# Patient Record
Sex: MALE | Race: AMERICAN INDIAN OR ALASKA NATIVE
[De-identification: names, ages, dates, MRNs, and addresses within clinical notes are randomized per-mention and may not be internally consistent; named-entity substitution may affect disease eponyms.]

---

## 2018-09-16 ENCOUNTER — HOSPITAL ENCOUNTER (EMERGENCY)
Dept: HOSPITAL 31 - C.ER | Age: 58
LOS: 1 days | Discharge: HOME | End: 2018-09-17
Payer: COMMERCIAL

## 2018-09-16 VITALS — TEMPERATURE: 98.2 F

## 2018-09-16 DIAGNOSIS — F11.10: Primary | ICD-10-CM

## 2018-09-16 LAB
ALBUMIN SERPL-MCNC: 4.4 G/DL (ref 3.5–5)
ALBUMIN/GLOB SERPL: 1.5 {RATIO} (ref 1–2.1)
ALT SERPL-CCNC: 34 U/L (ref 21–72)
AST SERPL-CCNC: 29 U/L (ref 17–59)
BASE EXCESS BLDV CALC-SCNC: 5.3 MMOL/L (ref 0–2)
BASOPHILS # BLD AUTO: 0 K/UL (ref 0–0.2)
BASOPHILS NFR BLD: 0.6 % (ref 0–2)
BNP SERPL-MCNC: < 11.1 PG/ML (ref 0–900)
BUN SERPL-MCNC: 15 MG/DL (ref 9–20)
CALCIUM SERPL-MCNC: 9.5 MG/DL (ref 8.6–10.4)
EOSINOPHIL # BLD AUTO: 0.1 K/UL (ref 0–0.7)
EOSINOPHIL NFR BLD: 1.8 % (ref 0–4)
ERYTHROCYTE [DISTWIDTH] IN BLOOD BY AUTOMATED COUNT: 14.3 % (ref 11.5–14.5)
GFR NON-AFRICAN AMERICAN: > 60
HGB BLD-MCNC: 14.8 G/DL (ref 12–18)
LIPASE: 79 U/L (ref 23–300)
LYMPHOCYTES # BLD AUTO: 2.7 K/UL (ref 1–4.3)
LYMPHOCYTES NFR BLD AUTO: 38 % (ref 20–40)
MCH RBC QN AUTO: 28.9 PG (ref 27–31)
MCHC RBC AUTO-ENTMCNC: 33.3 G/DL (ref 33–37)
MCV RBC AUTO: 86.7 FL (ref 80–94)
MONOCYTES # BLD: 0.8 K/UL (ref 0–0.8)
MONOCYTES NFR BLD: 11.9 % (ref 0–10)
NEUTROPHILS # BLD: 3.4 K/UL (ref 1.8–7)
NEUTROPHILS NFR BLD AUTO: 47.7 % (ref 50–75)
NRBC BLD AUTO-RTO: 0.1 % (ref 0–2)
PCO2 BLDV: 49 MMHG (ref 40–60)
PH BLDV: 7.41 [PH] (ref 7.32–7.43)
PLATELET # BLD: 192 K/UL (ref 130–400)
PMV BLD AUTO: 10.3 FL (ref 7.2–11.7)
RBC # BLD AUTO: 5.11 MIL/UL (ref 4.4–5.9)
VENOUS BLOOD FIO2: 21 %
VENOUS BLOOD GAS PO2: 58 MM/HG (ref 30–55)
WBC # BLD AUTO: 7.1 K/UL (ref 4.8–10.8)

## 2018-09-16 NOTE — C.PDOC
History Of Present Illness


58 year old male with PMHx of non insulin dependent diabetic presents to the ED 

c/o SOB, polyuria and polydipsia. Patient also states that he feels occasional 

night sweats as well. Patient is speaking in complete sentences. Patient denies 

fever, chills, nausea, vomit.


Time Seen by Provider: 09/16/18 22:32


Chief Complaint (Nursing): Shortness Of Breath


History Per: Patient


History/Exam Limitations: no limitations


Onset/Duration Of Symptoms: Days


Current Symptoms Are (Timing): Still Present


Initiating Event: Other


Quality: Other


Current Respiratory Medications: See Home Med List


Associated Symptoms: Other (polyuria and polydipsia)


Recent travel outside of the [place]: No


Additional History Per: Patient





Past Medical History


Reviewed: Historical Data, Nursing Documentation, Vital Signs


Vital Signs: 


 Last Vital Signs











Temp  98.2 F   09/16/18 22:31


 


Pulse  83   09/17/18 02:30


 


Resp  11 L  09/17/18 02:30


 


BP  97/64 L  09/17/18 02:30


 


Pulse Ox  90 L  09/17/18 02:30














- Medical History


PMH: Diabetes


Surgical History: No Surg Hx


Family History: States: No Known Family Hx





- Social History


Hx Tobacco Use: No


Hx Alcohol Use: No


Hx Substance Use: No





Review Of Systems


Constitutional: Negative for: Fever, Chills


Cardiovascular: Negative for: Chest Pain


Respiratory: Positive for: Shortness of Breath.  Negative for: Cough


Gastrointestinal: Negative for: Nausea, Vomiting, Abdominal Pain


Genitourinary: Negative for: Dysuria, Hematuria


Skin: Negative for: Rash


Neurological: Negative for: Weakness, Numbness





Physical Exam





- Physical Exam


Appears: Non-toxic, No Acute Distress, Other (morbidly obese)


Skin: Warm, Dry


Head: Normacephalic


Eye(s): bilateral: Normal Inspection


Oral Mucosa: Moist


Neck: Supple


Chest: Symmetrical


Cardiovascular: Rhythm Regular


Respiratory: No Rales, No Rhonchi, No Wheezing


Gastrointestinal/Abdominal: Bowel Sounds (active), Soft, No Tenderness, 

Distention, No Guarding, No Rebound, Other (obese)


Male Genital: Other (right testicle undescended, barely palpable at inguinal 

canal. Left testicle normal. )


Extremity: No Tenderness, No Swelling


Extremity: Bilateral: Atraumatic, Normal Color And Temperature, Normal ROM


Neurological/Psych: Oriented x3, Normal Speech


Gait: Steady





ED Course And Treatment





- Laboratory Results


Result Diagrams: 


 09/16/18 23:07





 09/16/18 23:07


ECG: Interpreted By Me, Viewed By Me


ECG Rhythm: Sinus Rhythm (97), Nonspecific Changes


Pulse Ox Interpretation: Normal





- Radiology


CXR: Interpreted by Me, Viewed By Me


CXR Interpretation: Yes: Cardiomegaly, Other (chf).  No: Infiltrates, Fracture


Progress Note: Plan:  - VBG.  - CT head.  - Labs.  - CXR.  - UA.  Patient 

states that his right testicle being undescended has been a chronic condition 

that he has had for the past couple months.


Reevaluation Time: 02:37


Reassessment Condition: Improved





Medical Decision Making


Medical Decision Making: 





Upon provider reevaluation patient is feeling better, is medically stable, and 

requires no further treatment in the ED at this time. Patient will be 

discharged home  . Counseling was provided and all questions were answered 

regarding diagnosis and need for follow up with dr bryant. There is agreement 

to discharge plan. Return if symptoms persist or worsen





Disposition


Counseled Patient/Family Regarding: Studies Performed, Diagnosis, Need For 

Followup





- Disposition


Referrals: 


Tylor Bryant MD [Staff Provider] - 


Disposition: HOME/ ROUTINE


Disposition Time: 22:33


Condition: FAIR


Additional Instructions: 


Please return if symptom recur


Instructions:  Opioid Use Disorder


Forms:  [facility] (English)





- Clinical Impression


Clinical Impression: 


 Opioid use disorder








- Scribe Statement


The provider has reviewed the documentation as recorded by the Scribe


Lewis Merlos





All medical record entries made by the Scribe were at my direction and 

personally dictated by me. I have reviewed the chart and agree that the record 

accurately reflects my personal performance of the history, physical exam, 

medical decision making, and the department course for this patient. I have 

also personally directed, reviewed, and agree with the discharge instructions 

and disposition.

## 2018-09-17 VITALS
OXYGEN SATURATION: 90 % | DIASTOLIC BLOOD PRESSURE: 64 MMHG | SYSTOLIC BLOOD PRESSURE: 97 MMHG | HEART RATE: 83 BPM | RESPIRATION RATE: 11 BRPM

## 2018-09-17 LAB
BILIRUB UR-MCNC: NEGATIVE MG/DL
GLUCOSE UR STRIP-MCNC: NORMAL MG/DL
HYALINE CASTS #/AREA URNS LPF: (no result) /LPF (ref 0–2)
LEUKOCYTE ESTERASE UR-ACNC: (no result) LEU/UL
PH UR STRIP: 7 [PH] (ref 5–8)
PROT UR STRIP-MCNC: NEGATIVE MG/DL
RBC # UR STRIP: NEGATIVE /UL
SP GR UR STRIP: 1.02 (ref 1–1.03)
SQUAMOUS EPITHIAL: < 1 /HPF (ref 0–5)
UROBILINOGEN UR-MCNC: NORMAL MG/DL (ref 0.2–1)

## 2018-09-17 NOTE — RAD
Chest x-ray single frontal view 



History: Diabetic. 



Comparison: 10/12/2012 



Findings: 



Moderate venous congestion. 



Cardiomegaly. 



Biapical pleural thickening with upper lobe granulomatous changes. 



Degenerative changes in the spine. 



Impression: 



Moderate venous congestion. 



Cardiomegaly.

## 2018-09-17 NOTE — CARD
--------------- APPROVED REPORT --------------





Date of service: 09/16/2018



EKG Measurement

Heart [id number]

OK [id number]

[id number]

[id number]

[id number]



<Conclusion>

Normal sinus rhythm

Nonspecific T wave abnormality

Abnormal ECG

## 2018-09-17 NOTE — CT
Date of service: 



09/17/2018



PROCEDURE:  CT HEAD WITHOUT CONTRAST.



HISTORY:

dizzyness



COMPARISON:

None available.



TECHNIQUE:

Axial computed tomography images were obtained through the head/brain 

without intravenous contrast.  



Radiation dose:



Total exam DLP = 1080 mGy-cm.



This CT exam was performed using one or more of the following dose 

reduction techniques: Automated exposure control, adjustment of the 

mA and/or kV according to patient size, and/or use of iterative 

reconstruction technique.



FINDINGS:



HEMORRHAGE:

No intracranial hemorrhage. 



BRAIN:

No mass effect or edema.  Scattered focal lucencies in the 

subcortical and periventricular white matter suggestive for chronic 

microvascular ischemic change. Punctate left basal ganglia lacunar 

infarct. 



VENTRICLES:

Unremarkable. No hydrocephalus. 



CALVARIUM:

Unremarkable. Bony protuberance/exostosis emanating off the posterior 

right occipital calvarium. 



PARANASAL SINUSES:

Small amount of fluid in the left sphenoid sinus.



MASTOID AIR CELLS:

Unremarkable as visualized. No inflammatory changes.



OTHER FINDINGS:

None.



IMPRESSION:

Small amount of fluid in the left sphenoid sinus.  Clinical 

correlation.



Chronic microvascular ischemic changes.



If symptoms persist, consider correlation with MRI.



These findings were preliminarily reported at 12:44 a.m. on 9/17/2018 

by Dr. Blayne Martínez from [facility].

## 2018-12-26 ENCOUNTER — HOSPITAL ENCOUNTER (INPATIENT)
Dept: HOSPITAL 31 - C.ER | Age: 58
LOS: 2 days | Discharge: HOME | DRG: 125 | End: 2018-12-28
Attending: INTERNAL MEDICINE | Admitting: INTERNAL MEDICINE
Payer: COMMERCIAL

## 2018-12-26 DIAGNOSIS — H53.8: Primary | ICD-10-CM

## 2018-12-26 DIAGNOSIS — E78.5: ICD-10-CM

## 2018-12-26 DIAGNOSIS — F40.240: ICD-10-CM

## 2018-12-26 DIAGNOSIS — N40.0: ICD-10-CM

## 2018-12-26 DIAGNOSIS — Z79.4: ICD-10-CM

## 2018-12-26 DIAGNOSIS — R20.0: ICD-10-CM

## 2018-12-26 DIAGNOSIS — F41.9: ICD-10-CM

## 2018-12-26 DIAGNOSIS — F11.90: ICD-10-CM

## 2018-12-26 DIAGNOSIS — I50.9: ICD-10-CM

## 2018-12-26 DIAGNOSIS — E78.00: ICD-10-CM

## 2018-12-26 DIAGNOSIS — E11.9: ICD-10-CM

## 2018-12-26 DIAGNOSIS — Z86.73: ICD-10-CM

## 2018-12-26 DIAGNOSIS — I11.0: ICD-10-CM

## 2018-12-26 DIAGNOSIS — F17.210: ICD-10-CM

## 2018-12-26 LAB
ALBUMIN SERPL-MCNC: 4.2 G/DL (ref 3.5–5)
ALBUMIN/GLOB SERPL: 1.5 {RATIO} (ref 1–2.1)
ALT SERPL-CCNC: 28 U/L (ref 21–72)
APTT BLD: 43 SECONDS (ref 21–34)
AST SERPL-CCNC: 23 U/L (ref 17–59)
BACTERIA #/AREA URNS HPF: (no result) /[HPF]
BASOPHILS # BLD AUTO: 0 K/UL (ref 0–0.2)
BASOPHILS NFR BLD: 0.5 % (ref 0–2)
BILIRUB UR-MCNC: NEGATIVE MG/DL
BUN SERPL-MCNC: 11 MG/DL (ref 9–20)
CALCIUM SERPL-MCNC: 8.6 MG/DL (ref 8.6–10.4)
EOSINOPHIL # BLD AUTO: 0.1 K/UL (ref 0–0.7)
EOSINOPHIL NFR BLD: 1.3 % (ref 0–4)
ERYTHROCYTE [DISTWIDTH] IN BLOOD BY AUTOMATED COUNT: 14.1 % (ref 11.5–14.5)
GFR NON-AFRICAN AMERICAN: > 60
GLUCOSE UR STRIP-MCNC: NORMAL MG/DL
HDLC SERPL-MCNC: 49 MG/DL (ref 30–70)
HGB BLD-MCNC: 14.6 G/DL (ref 12–18)
INR PPP: 1.1
LDLC SERPL-MCNC: 98 MG/DL (ref 0–129)
LEUKOCYTE ESTERASE UR-ACNC: (no result) LEU/UL
LYMPHOCYTES # BLD AUTO: 2.5 K/UL (ref 1–4.3)
LYMPHOCYTES NFR BLD AUTO: 38.5 % (ref 20–40)
MCH RBC QN AUTO: 28.4 PG (ref 27–31)
MCHC RBC AUTO-ENTMCNC: 32.7 G/DL (ref 33–37)
MCV RBC AUTO: 86.9 FL (ref 80–94)
MONOCYTES # BLD: 0.7 K/UL (ref 0–0.8)
MONOCYTES NFR BLD: 10.1 % (ref 0–10)
NEUTROPHILS # BLD: 3.2 K/UL (ref 1.8–7)
NEUTROPHILS NFR BLD AUTO: 49.6 % (ref 50–75)
NRBC BLD AUTO-RTO: 0.1 % (ref 0–2)
PH UR STRIP: 5 [PH] (ref 5–8)
PLATELET # BLD: 202 K/UL (ref 130–400)
PMV BLD AUTO: 10.1 FL (ref 7.2–11.7)
PROT UR STRIP-MCNC: NEGATIVE MG/DL
PROTHROMBIN TIME: 12.4 SECONDS (ref 9.7–12.2)
RBC # BLD AUTO: 5.13 MIL/UL (ref 4.4–5.9)
RBC # UR STRIP: NEGATIVE /UL
SP GR UR STRIP: 1.04 (ref 1–1.03)
SQUAMOUS EPITHIAL: < 1 /HPF (ref 0–5)
UROBILINOGEN UR-MCNC: NORMAL MG/DL (ref 0.2–1)
WBC # BLD AUTO: 6.5 K/UL (ref 4.8–10.8)

## 2018-12-26 NOTE — C.PDOC
History Of Present Illness





pt presents with 4 days of left vision change, decreased vision - occasionally 

blind in left eye, and right sided numbness. No f/c/n/v. No cp or palpitations


Time Seen by Provider: 12/26/18 20:29


Chief Complaint (Nursing): Weakness/Neurological Deficit


History Per: Patient, Family


History/Exam Limitations: no limitations


Onset/Duration Of Symptoms: Days (4)


Current Symptoms Are (Timing): Still Present


Seizure Or Post-ictal Symptoms: None


Fall Associated With With Symptoms: No


Severity: None


Recent travel outside of the [place]: No





- Symptoms Of CVA


Associated Symptoms: New Vision Deficit(Left)


Character Of Deficits: Left: Vision Problems


Recent Aspirin Use: No


Current Coumadin Use?: No


Recent Head Trauma: No





Past Medical History


Reviewed: Historical Data, Nursing Documentation, Vital Signs


Vital Signs: 





                                Last Vital Signs











Temp  98.6 F   12/26/18 19:24


 


Pulse  95 H  12/26/18 19:24


 


Resp  16   12/26/18 19:24


 


BP  177/97 H  12/26/18 19:24


 


Pulse Ox  96   12/26/18 19:24














- Medical History


PMH: Diabetes, HTN, Hypercholesterolemia


Family History: States: No Known Family Hx





- Social History


Hx Tobacco Use: No


Hx Alcohol Use: No


Hx Substance Use: Yes





- Immunization History


Hx Tetanus Toxoid Vaccination: No


Hx Influenza Vaccination: No


Hx Pneumococcal Vaccination: No





Review Of Systems


Constitutional: Negative for: Fever, Chills


Eyes: Positive for: Vision Change


ENT: Negative for: Throat Pain


Cardiovascular: Negative for: Chest Pain


Respiratory: Negative for: Shortness of Breath


Gastrointestinal: Negative for: Nausea, Vomiting, Abdominal Pain


Genitourinary: Negative for: Dysuria


Musculoskeletal: Negative for: Back Pain


Skin: Negative for: Rash


Neurological: Positive for: Numbness (right side)


Psych: Negative for: Anxiety





Physical Exam





- Physical Exam


Appears: Non-toxic, No Acute Distress


Skin: Warm, Dry


Head: Normacephalic


Eye(s): bilateral: Normal Inspection, PERRL


Oral Mucosa: Moist


Neck: Trachea Midline, Supple


Chest: Symmetrical


Cardiovascular: Rhythm Regular


Respiratory: No Rales, No Rhonchi, No Wheezing


Gastrointestinal/Abdominal: Soft, No Tenderness, No Distention


Back: No CVA Tenderness


Extremity: Normal ROM


Extremity: Bilateral: Atraumatic, No Pedal Edema, Normal Color And Temperature


Pulses: Left Dorsalis Pedis: Normal, Right Dorsalis Pedis: Normal


Neurological/Psych: Oriented x3, Normal Cranial Nerves, Normal Sensation, Other 

(right sided paresthisia)


Gait: With Assistance (cane)





ED Course And Treatment





- Laboratory Results


Result Diagrams: 


                                 12/26/18 20:42





                                 12/26/18 20:42


ECG: Interpreted By Me, Viewed By Me


ECG Rhythm: Sinus Rhythm, Nonspecific Changes


O2 Sat by Pulse Oximetry: 96


Pulse Ox Interpretation: Normal


Progress Note: spoke with dr stark, neurology - is aware of ct findings. she spoke

with the interventional neurologist - no intervention.  Ok with asa 325 mg.  

spoke with the patient , and his wife(hipaa compliant)  and is aware of the ct 

findings and diagnosis





Critical Care Time





- Critical Care Note


Total Time (in mins): 30


Documented critical care: time excludes all time spent performing seperately 

billable procedures.





NIHSS Stroke Scale 2





- Date/Time Evaluation Performed


Date Performed: 12/26/18


Time Performed: 20:24


When Was NIHSS Performed: Baseline





- How Severe is the Stroke


Level of Consciousness: 0=Alert


LOC to Questions: 0=Both comments correct


LOC to commands: 0=Obeys both correctly


Best Gaze: 0=Normal


Visual: 1=Partial hemianopia


Facial: 0=Normal


Motor Arm - Left: 0=No drift


Motor Arm - Right: 0=No drift


Motor Leg - Left: 0=No drift


Motor Leg - Right: 1=Drift before 5 sec


Limb Ataxia: 0=Absent


Sensory: 0=Normal


Best Language: 0=No aphasia


Dysarthia: 0=Normal articulation


Extinction & Inattention (Neglect): 0=Normal, no object


Score: 2





rTPA Inclusion/Exclusion





- Refusal of Treatment


Patient Refused Treatment: No





- Inclusion Criteria for Altepase


Patient is 18 years or Older: Yes


The Clinical Diagnosis of Ischemic Stroke That is Causing a Potentially 

Disabling Neurological Deficit: Yes


Time of Onset is Well Established to be Less Than 270 Minute Before Treatment 

Would Begin: No


Risk/Benefit Discussed With Patient/Family Member Present: Yes





- Exclusion Criteria for Altepase


Uncontrolled Hypertension at Time of Treatment (Systolic BP above 185 or Taveras

tolic BP above 110 mmHg): No


Active Internal Bleeding: No


Known Bleeding Diathesis Including but Not Limited to: Platelets Below 

100,000/mm,PTT Above 40 sec After Heparin Use, Current Use of Oral Anitcoagulant

With INR Greater Than 1.7 or PT Greater Than 15 secs: No


Evidence of an Intracranial Hemorrhage: No


Evidence of Major Acute Infarct With Signs Greater Than 1/3 MCA Territory: No


Suspicion of Subarachnoid Hemorrhage on Pretreatment Evaluation Even if CT Head 

Negative For Hemorrhage: No





- Warning to TPA With Conditions


Following Conditions Weighed Against Anticipated Benefit: Yes


Condition: Stroke Serevity Too Mild





Disposition


Discussed With Dr.: Wallace Davidson


Comment: accepted the pt onhis service and took over the care at 9:25 PM


Doctor Will See Patient In The: ED


Counseled Patient/Family Regarding: Studies Performed, Diagnosis





- Disposition


Disposition: HOSPITALIZED


Disposition Time: 20:29


Condition: GUARDED


Forms:  [facility] (English)





- POA


Present On Arrival: None





- Clinical Impression


Clinical Impression: 


 CVA (cerebral vascular accident)








Decision To Admit





- Pt Status Changed To:


Hospital Disposition Of: Inpatient





- Admit Certification


Admit to Inpatient:: After my assessment, the patient will require 

hospitalization for at least two midnights.  This is because of the severity of 

symptoms shown, intensity of services needed, and/or the medical risk in this 

patient being treated as an outpatient.





- InPatient:


Physician Admission Certification: I certify that this patient requires 2 or 

more midnights of care for the following reason:: After my assessment, the 

patient will require hospitalization for at least two midnights.  This is 

because of the severity of symptoms shown, intensity of services needed, and/or 

the medical risk in this patient being treated as an outpatient.





- .


Bed Request Type: Telemetry


Admitting Physician: Wallace Davidson


Patient Diagnosis: 


 CVA (cerebral vascular accident)

## 2018-12-27 VITALS — RESPIRATION RATE: 20 BRPM

## 2018-12-27 LAB
ALBUMIN SERPL-MCNC: 3.7 G/DL (ref 3.5–5)
ALBUMIN/GLOB SERPL: 1.4 {RATIO} (ref 1–2.1)
ALT SERPL-CCNC: 30 U/L (ref 21–72)
AST SERPL-CCNC: 19 U/L (ref 17–59)
BASOPHILS # BLD AUTO: 0.1 K/UL (ref 0–0.2)
BASOPHILS NFR BLD: 0.8 % (ref 0–2)
BUN SERPL-MCNC: 12 MG/DL (ref 9–20)
CALCIUM SERPL-MCNC: 8.2 MG/DL (ref 8.6–10.4)
EOSINOPHIL # BLD AUTO: 0.1 K/UL (ref 0–0.7)
EOSINOPHIL NFR BLD: 1.9 % (ref 0–4)
ERYTHROCYTE [DISTWIDTH] IN BLOOD BY AUTOMATED COUNT: 14.3 % (ref 11.5–14.5)
GFR NON-AFRICAN AMERICAN: > 60
HGB BLD-MCNC: 13.8 G/DL (ref 12–18)
LYMPHOCYTES # BLD AUTO: 2.6 K/UL (ref 1–4.3)
LYMPHOCYTES NFR BLD AUTO: 38.4 % (ref 20–40)
MCH RBC QN AUTO: 28.7 PG (ref 27–31)
MCHC RBC AUTO-ENTMCNC: 32.7 G/DL (ref 33–37)
MCV RBC AUTO: 87.9 FL (ref 80–94)
MONOCYTES # BLD: 0.6 K/UL (ref 0–0.8)
MONOCYTES NFR BLD: 9.5 % (ref 0–10)
NEUTROPHILS # BLD: 3.3 K/UL (ref 1.8–7)
NEUTROPHILS NFR BLD AUTO: 49.4 % (ref 50–75)
NRBC BLD AUTO-RTO: 0.1 % (ref 0–2)
PLATELET # BLD: 182 K/UL (ref 130–400)
PMV BLD AUTO: 9.9 FL (ref 7.2–11.7)
RBC # BLD AUTO: 4.79 MIL/UL (ref 4.4–5.9)
WBC # BLD AUTO: 6.7 K/UL (ref 4.8–10.8)

## 2018-12-27 RX ADMIN — HUMAN INSULIN SCH: 100 INJECTION, SOLUTION SUBCUTANEOUS at 07:38

## 2018-12-27 RX ADMIN — HUMAN INSULIN SCH: 100 INJECTION, SOLUTION SUBCUTANEOUS at 11:30

## 2018-12-27 RX ADMIN — HUMAN INSULIN SCH: 100 INJECTION, SOLUTION SUBCUTANEOUS at 21:45

## 2018-12-27 RX ADMIN — HUMAN INSULIN SCH: 100 INJECTION, SOLUTION SUBCUTANEOUS at 17:44

## 2018-12-27 NOTE — RAD
Date of service: 



12/26/2018



HISTORY:

 Code Stroke 



COMPARISON:

9/16/2018 



FINDINGS:



LUNGS:

No consolidation appreciated.



PLEURA:

No significant pleural effusion identified, no pneumothorax apparent.



CARDIOVASCULAR:

There is presence of aortic atherosclerotic calcification on x-ray.



Cardiomegaly-allowing for differences in technique likely similar.  

Pulmonary venous congestion suggested--similar to increased since 

prior exam. 



Tortuous thoracic aorta accentuated. 



OSSEOUS STRUCTURES:

Dextroscoliosis suggested.



VISUALIZED UPPER ABDOMEN:

Normal.



OTHER FINDINGS:

None.



IMPRESSION:

No interval consolidation. 



Cardiomegaly and pulmonary venous congestion-as above.

## 2018-12-27 NOTE — CARD
--------------- APPROVED REPORT --------------





Date of service: 12/27/2018



EXAM: Two-dimensional and M-mode echocardiogram with Doppler and 

color Doppler.



Other Information 

Technically limited study due to  Patient was uncooperative



INDICATION

CVA/TIA opioid use



RISK FACTORS

Hypertension 

Hyperlipidemia

Diabetes

Smoking 



2D DIMENSIONS 

IVSd0.9   (0.7-1.1cm)Aortic Root (2D)3.4   (2.0-3.7cm)

LVDd4.8   (3.9-5.9cm)PWd0.8   (0.7-1.1cm)

LVDs3.1   (2.5-4.0cm)FS (%) 35.1   %

LVEF (%)64.3   (>50%)IVC0.00 cm



M-Mode DIMENSIONS 

Left Atrium (MM)3.75   (2.5-4.0cm)IVSd0.69   (0.7-1.1cm)

Aortic Root3.09   (2.2-3.7cm)LVDd5.48   (4.0-5.6cm)

Aortic Cusp Exc.1.81   (1.5-2.0cm)PWd0.69   (0.7-1.1cm)

FS (%) 31   %LVDs3.78   (2.0-3.8cm)

LVEF (%)58   (>50%)



Mitral Valve

MV E Vvexdhcu969.3cm/sMV A Tpujydpa11.0cm/sE/A ratio1.3



TDI

Lateral E' Peak V9.64cm/sMedial E' Peak V7.77cm/sE/Lateral E'10.6

E/Medial E'13.2



Tricuspid Valve

TR Peak Gwnzbyea008dt/sTR Peak Gr.[id number]



 LEFT VENTRICLE 

The left ventricle is normal size.

There is normal left ventricular wall thickness.

Left ventricle systolic function is normal. The Ejection Fraction is 

60-65%.

There is normal LV segmental wall motion.

The left ventricular diastolic function is normal.

No left ventricle thrombus noted on this study.



 RIGHT VENTRICLE 

The right ventricle is normal size.

The right ventricular systolic function is normal.



 ATRIA 

The left atrium size is normal.

The right atrium size is normal.



 AORTIC VALVE 

The aortic valve is mildly sclerotic.

The aortic valve is trileaflet.

No aortic regurgitation is present.

There is no aortic valvular stenosis. 

There is no aortic valvular vegetation.



 MITRAL VALVE 

Mitral annular calcification is mild.

There is no evidence of mitral valve prolapse.

There is no mitral valve stenosis.

Mitral regurgitation is trace to mild.



 TRICUSPID VALVE 

The tricuspid valve is normal in structure.

There is mild tricuspid regurgitation.

Right ventricular systolic pressure is estimated at less than 30 

mmHg. 

There is no pulmonary hypertension.

There is no tricuspid valve prolapse or vegetation.

There is no tricuspid valve stenosis. 



 PULMONIC VALVE 

The pulmonic valve is not well visualized.

There is no pulmonic valvular regurgitation. 



 GREAT VESSELS 

The aortic root is normal in size.

The IVC is normal in size and collapses >50% with inspiration.



 PERICARDIAL EFFUSION 

There is no pericardial effusion.

There is no pleural effusion.



<Conclusion>

The left ventricle is normal size.

Left ventricle systolic function is normal. The Ejection Fraction is 

60-65%.

The left ventricular diastolic function is normal.

The right ventricle is normal size.

The right ventricular systolic function is normal.

The left atrium size is normal.

The right atrium size is normal.

Mitral regurgitation is trace to mild.

There is mild tricuspid regurgitation.

## 2018-12-27 NOTE — CP.PCM.HP
<Sybil Jefferson - Last Filed: 12/27/18 07:13>





History of Present Illness





- History of Present Illness


History of Present Illness: 





CC vision problems, right side numbness and weakness 





HPI: Patient is a 58 year old male with history of HTN, DM,HLD, BPH who presents

with 4 day history of progressively worsening right sided arm and leg numbness 

and weakness with blurriness and redness of left eye. He states that he has 

never had these symptoms before. He admits to using heroin, but does not want 

his wife to know. He states that he did not think much of it initially, however 

later noticed that his right side felt more numb and heavy than before. He also 

states that he has issues with his vision, described as difficulty seeing on his

right side. He states that he continues to be able to see on his left  side, but

not on his right side unless he turns to face it completely. He is able to see 

things that are up close, however has more difficulty seeing things at a 

distance. He denies fevers, chills, headache, dizziness, chest pain, shortness 

of breath, nausea, vomiting, diarrhea, constipation, abdominal pain, urinary 

symptoms, leg pain. He states that he has been able to walk given his symptoms 

of numbness and weakness on his right side. 


Patient denies prior history of CVA. 





PMD: Elamir 





PMH: HTN, DM, BPH, HLD


PSH: cataract surgery 


Social hx: heroin use (not known to wife), smokes 12 cigarettes daily, denies 

alcohol use. 


Family hx: 


Allergies: NKDA 


Home meds: Metformin 500mg PO BID, Flomax 0.4mg PO daily, Lipitor 20mg PO daily,

ASA 81mg, Valsartan/HCTZ 12.5/160, Metoprolol 25mg PO daily


   





Present on Admission





- Present on Admission


Any Indicators Present on Admission: No





Review of Systems





- Constitutional


Constitutional: absent: Chills, Fever, Headache





- EENT


Eyes: Change in Vision


Ears: absent: Decreased Hearing


Nose/Mouth/Throat: absent: Nasal Congestion, Change in Voice, Hoarsness, Sore 

Throat





- Cardiovascular


Cardiovascular: absent: Chest Pain, Dyspnea, Palpitations, Syncope





- Respiratory


Respiratory: absent: Cough, Dyspnea





- Gastrointestinal


Gastrointestinal: absent: Abdominal Pain, Nausea, Vomiting





- Genitourinary


Genitourinary: absent: Difficulty Urinating, Dysuria





- Neurological


Neurological: Abnormal Gait, Numbness, Focal Weakness, Paresthesias





- Psychiatric


Psychiatric: absent: Anxiety, Depression





Past Patient History





- Past Social History


Smoking Status: Heavy Smoker > 10 Cigarettes Daily





- CARDIAC


Hx Hypercholesterolemia: Yes


Hx Hypertension: Yes





- ENDOCRINE/METABOLIC


Hx Diabetes Mellitus Type 2: Yes





- GENITOURINARY/GYNECOLOGICAL


Hx Prostate Problems: Yes





- PSYCHIATRIC


Hx Substance Use: Yes





- SURGICAL HISTORY


Hx Surgeries: No





Meds


Allergies/Adverse Reactions: 


                                    Allergies











Allergy/AdvReac Type Severity Reaction Status Date / Time


 


No Known Allergies Allergy   Unverified 09/16/18 22:33














Physical Exam





- Constitutional


Appears: Well, Non-toxic, No Acute Distress





- Head Exam


Head Exam: ATRAUMATIC, NORMOCEPHALIC





- Eye Exam


Eye Exam: Conjunctival injection (on left ), EOMI.  absent: Nystagmus, Scleral 

icterus


Pupil Exam: PERRL


Additional comments: 





Unable to see objects in right visual field 


Can see in central field and left visual field 





- ENT Exam


ENT Exam: Mucous Membranes Moist, Normal Oropharynx





- Neck Exam


Neck exam: Positive for: Full Rom.  Negative for: Tenderness





- Respiratory Exam


Respiratory Exam: Clear to Auscultation Bilateral, NORMAL BREATHING PATTERN.  

absent: Rales, Rhonchi, Wheezes, Respiratory Distress, Stridor





- Cardiovascular Exam


Cardiovascular Exam: REGULAR RHYTHM, +S1, +S2.  absent: Gallop, Rubs, Systolic 

Murmur





- GI/Abdominal Exam


GI & Abdominal Exam: Normal Bowel Sounds, Soft.  absent: Distended, Firm, 

Guarding, Hernia, Tenderness





- Extremities Exam


Extremities exam: Positive for: normal capillary refill, pedal pulses present.  

Negative for: calf tenderness, tenderness





- Neurological Exam


Neurological exam: Alert, Oriented x3


Additional comments: 





Sensation altered on right upper and lower extremity compared to left 


Drift noted on RUE and RLE compared to left 


Eyes can follow H in space 


Decreased sensation on entire right face 


Able to move tongue to both sides


no facial asymmetry 








- Psychiatric Exam


Psychiatric exam: Anxious (Asks multiple questions often repeated, unclear if 

this is a new finding of stroke or if patient is anxious )





- Skin


Skin Exam: Dry, Intact, Warm





Results





- Vital Signs


Recent Vital Signs: 





                                Last Vital Signs











Temp  98.3 F   12/26/18 21:31


 


Pulse  74   12/26/18 21:31


 


Resp  14   12/26/18 21:31


 


BP  142/90   12/26/18 21:31


 


Pulse Ox  96   12/26/18 21:33














- Labs


Result Diagrams: 


                                 12/27/18 06:04





                                 12/27/18 06:04


Labs: 





                         Laboratory Results - last 24 hr











  12/26/18 12/26/18 12/26/18





  19:28 20:42 20:42


 


WBC   6.5 


 


RBC   5.13 


 


Hgb   14.6 


 


Hct   44.5 


 


MCV   86.9 


 


MCH   28.4 


 


MCHC   32.7 L 


 


RDW   14.1 


 


Plt Count   202 


 


MPV   10.1 


 


Neut % (Auto)   49.6 L 


 


Lymph % (Auto)   38.5 


 


Mono % (Auto)   10.1 H 


 


Eos % (Auto)   1.3 


 


Baso % (Auto)   0.5 


 


Neut # (Auto)   3.2 


 


Lymph # (Auto)   2.5 


 


Mono # (Auto)   0.7 


 


Eos # (Auto)   0.1 


 


Baso # (Auto)   0.0 


 


PT    12.4 H


 


INR    1.1


 


APTT    43 H


 


Sodium   


 


Potassium   


 


Chloride   


 


Carbon Dioxide   


 


Anion Gap   


 


BUN   


 


Creatinine   


 


Est GFR ( Amer)   


 


Est GFR (Non-Af Amer)   


 


POC Glucose (mg/dL)  84  


 


Random Glucose   


 


Hemoglobin A1c   


 


Calcium   


 


Total Bilirubin   


 


AST   


 


ALT   


 


Alkaline Phosphatase   


 


Troponin I   


 


Total Protein   


 


Albumin   


 


Globulin   


 


Albumin/Globulin Ratio   


 


Triglycerides   


 


Cholesterol   


 


LDL Cholesterol Direct   


 


HDL Cholesterol   


 


Urine Color   


 


Urine Clarity   


 


Urine pH   


 


Ur Specific Gravity   


 


Urine Protein   


 


Urine Glucose (UA)   


 


Urine Ketones   


 


Urine Blood   


 


Urine Nitrate   


 


Urine Bilirubin   


 


Urine Urobilinogen   


 


Ur Leukocyte Esterase   


 


Urine WBC (Auto)   


 


Urine RBC (Auto)   


 


Ur Squamous Epith Cells   


 


Urine Bacteria   


 


Urine Opiates Screen   


 


Urine Methadone Screen   


 


Ur Barbiturates Screen   


 


Ur Phencyclidine Scrn   


 


Ur Amphetamines Screen   


 


U Benzodiazepines Scrn   


 


U Oth Cocaine Metabols   


 


U Cannabinoids Screen   


 


Blood Type   


 


Antibody Screen   














  12/26/18 12/26/18 12/26/18





  20:42 20:42 20:45


 


WBC   


 


RBC   


 


Hgb   


 


Hct   


 


MCV   


 


MCH   


 


MCHC   


 


RDW   


 


Plt Count   


 


MPV   


 


Neut % (Auto)   


 


Lymph % (Auto)   


 


Mono % (Auto)   


 


Eos % (Auto)   


 


Baso % (Auto)   


 


Neut # (Auto)   


 


Lymph # (Auto)   


 


Mono # (Auto)   


 


Eos # (Auto)   


 


Baso # (Auto)   


 


PT   


 


INR   


 


APTT   


 


Sodium  139  


 


Potassium  3.8  


 


Chloride  107  


 


Carbon Dioxide  25  


 


Anion Gap  11  


 


BUN  11  


 


Creatinine  0.8  


 


Est GFR ( Amer)  > 60  


 


Est GFR (Non-Af Amer)  > 60  


 


POC Glucose (mg/dL)   


 


Random Glucose  100  D  


 


Hemoglobin A1c   6.7 H 


 


Calcium  8.6  


 


Total Bilirubin  0.3  


 


AST  23  


 


ALT  28  


 


Alkaline Phosphatase  62  


 


Troponin I  < 0.0120  


 


Total Protein  7.0  


 


Albumin  4.2  


 


Globulin  2.7  


 


Albumin/Globulin Ratio  1.5  


 


Triglycerides  98  


 


Cholesterol  164  


 


LDL Cholesterol Direct  98  


 


HDL Cholesterol  49  


 


Urine Color   


 


Urine Clarity   


 


Urine pH   


 


Ur Specific Gravity   


 


Urine Protein   


 


Urine Glucose (UA)   


 


Urine Ketones   


 


Urine Blood   


 


Urine Nitrate   


 


Urine Bilirubin   


 


Urine Urobilinogen   


 


Ur Leukocyte Esterase   


 


Urine WBC (Auto)   


 


Urine RBC (Auto)   


 


Ur Squamous Epith Cells   


 


Urine Bacteria   


 


Urine Opiates Screen   


 


Urine Methadone Screen   


 


Ur Barbiturates Screen   


 


Ur Phencyclidine Scrn   


 


Ur Amphetamines Screen   


 


U Benzodiazepines Scrn   


 


U Oth Cocaine Metabols   


 


U Cannabinoids Screen   


 


Blood Type    A POSITIVE


 


Antibody Screen    Negative














  12/26/18 12/26/18 12/26/18





  20:50 21:17 21:43


 


WBC   


 


RBC   


 


Hgb   


 


Hct   


 


MCV   


 


MCH   


 


MCHC   


 


RDW   


 


Plt Count   


 


MPV   


 


Neut % (Auto)   


 


Lymph % (Auto)   


 


Mono % (Auto)   


 


Eos % (Auto)   


 


Baso % (Auto)   


 


Neut # (Auto)   


 


Lymph # (Auto)   


 


Mono # (Auto)   


 


Eos # (Auto)   


 


Baso # (Auto)   


 


PT   


 


INR   


 


APTT   


 


Sodium   


 


Potassium   


 


Chloride   


 


Carbon Dioxide   


 


Anion Gap   


 


BUN   


 


Creatinine   


 


Est GFR ( Amer)   


 


Est GFR (Non-Af Amer)   


 


POC Glucose (mg/dL)  90  


 


Random Glucose   


 


Hemoglobin A1c   


 


Calcium   


 


Total Bilirubin   


 


AST   


 


ALT   


 


Alkaline Phosphatase   


 


Troponin I   


 


Total Protein   


 


Albumin   


 


Globulin   


 


Albumin/Globulin Ratio   


 


Triglycerides   


 


Cholesterol   


 


LDL Cholesterol Direct   


 


HDL Cholesterol   


 


Urine Color   Yellow 


 


Urine Clarity   Clear 


 


Urine pH   5.0 


 


Ur Specific Gravity   1.044 H 


 


Urine Protein   Negative 


 


Urine Glucose (UA)   Normal 


 


Urine Ketones   Negative 


 


Urine Blood   Negative 


 


Urine Nitrate   Negative 


 


Urine Bilirubin   Negative 


 


Urine Urobilinogen   Normal 


 


Ur Leukocyte Esterase   Neg 


 


Urine WBC (Auto)   1 


 


Urine RBC (Auto)   1 


 


Ur Squamous Epith Cells   < 1 


 


Urine Bacteria   Rare 


 


Urine Opiates Screen    Positive H


 


Urine Methadone Screen    Negative


 


Ur Barbiturates Screen    Negative


 


Ur Phencyclidine Scrn    Negative


 


Ur Amphetamines Screen    Negative


 


U Benzodiazepines Scrn    Negative


 


U Oth Cocaine Metabols    Negative


 


U Cannabinoids Screen    Negative


 


Blood Type   


 


Antibody Screen   














Assessment & Plan





- Assessment and Plan (Free Text)


Assessment: 





58 year old male with history of HTN, DM, HLD who presents for 4 day history of 

progressively worsening right upper and lower extremity numbness with vision 

changes. 


Plan: 





Code stroke 


Vision changes


Right sided upper and lower extremity numbness and weakness 


Neurologist Dr. Cuevas consulted 


Received ASA 325mg PO in ED 


EKG SR at 76 


CT without contrast head: new left occipital hypodense area measuring 6.6 x 3.3 

x 2.4cm compatible with subacute infarct 


CTA CHF with pulmonary edema. Severe multilevel degenerative spondylosis. 

Calcified plaque at left carotid bulb wiith mild grade stenosis 0 -39% . 


f/u ECHO


f/u Brain MRI without contrast 


ASA 81mg PO 


Crestor 40mg PO QHS 





Hx of DM


ISS 


On Metformin at home - held 





Hx of HTN


Confirm meds with pharmacy





Hx of HLD 


Crestor 40mg PO HS 





Nicotine dependence


Nicotine patch 





Hx of BPH


Flomax 0.4mg PO 





PPX


Heparin 5000 units SC


Protonix 40mg IVP 


Heart healthy diet


PT/OT/speech 





Case discussed with Dr. Lety Jefferson, PGY1





<Wallace Davidson P - Last Filed: 12/27/18 07:36>





Results





- Vital Signs


Recent Vital Signs: 





                                Last Vital Signs











Temp  98.8 F   12/27/18 04:22


 


Pulse  69   12/27/18 04:22


 


Resp  20   12/27/18 04:22


 


BP  151/81 H  12/27/18 04:22


 


Pulse Ox  95   12/27/18 04:22














- Labs


Result Diagrams: 


                                 12/27/18 06:04





                                 12/27/18 06:04


Labs: 





                         Laboratory Results - last 24 hr











  12/26/18 12/26/18 12/26/18





  19:28 20:42 20:42


 


WBC   6.5 


 


RBC   5.13 


 


Hgb   14.6 


 


Hct   44.5 


 


MCV   86.9 


 


MCH   28.4 


 


MCHC   32.7 L 


 


RDW   14.1 


 


Plt Count   202 


 


MPV   10.1 


 


Neut % (Auto)   49.6 L 


 


Lymph % (Auto)   38.5 


 


Mono % (Auto)   10.1 H 


 


Eos % (Auto)   1.3 


 


Baso % (Auto)   0.5 


 


Neut # (Auto)   3.2 


 


Lymph # (Auto)   2.5 


 


Mono # (Auto)   0.7 


 


Eos # (Auto)   0.1 


 


Baso # (Auto)   0.0 


 


PT    12.4 H


 


INR    1.1


 


APTT    43 H


 


Sodium   


 


Potassium   


 


Chloride   


 


Carbon Dioxide   


 


Anion Gap   


 


BUN   


 


Creatinine   


 


Est GFR ( Amer)   


 


Est GFR (Non-Af Amer)   


 


POC Glucose (mg/dL)  84  


 


Random Glucose   


 


Hemoglobin A1c   


 


Calcium   


 


Phosphorus   


 


Magnesium   


 


Total Bilirubin   


 


AST   


 


ALT   


 


Alkaline Phosphatase   


 


Troponin I   


 


Total Protein   


 


Albumin   


 


Globulin   


 


Albumin/Globulin Ratio   


 


Triglycerides   


 


Cholesterol   


 


LDL Cholesterol Direct   


 


HDL Cholesterol   


 


Urine Color   


 


Urine Clarity   


 


Urine pH   


 


Ur Specific Gravity   


 


Urine Protein   


 


Urine Glucose (UA)   


 


Urine Ketones   


 


Urine Blood   


 


Urine Nitrate   


 


Urine Bilirubin   


 


Urine Urobilinogen   


 


Ur Leukocyte Esterase   


 


Urine WBC (Auto)   


 


Urine RBC (Auto)   


 


Ur Squamous Epith Cells   


 


Urine Bacteria   


 


Urine Opiates Screen   


 


Urine Methadone Screen   


 


Ur Barbiturates Screen   


 


Ur Phencyclidine Scrn   


 


Ur Amphetamines Screen   


 


U Benzodiazepines Scrn   


 


U Oth Cocaine Metabols   


 


U Cannabinoids Screen   


 


Blood Type   


 


Antibody Screen   














  12/26/18 12/26/18 12/26/18





  20:42 20:42 20:45


 


WBC   


 


RBC   


 


Hgb   


 


Hct   


 


MCV   


 


MCH   


 


MCHC   


 


RDW   


 


Plt Count   


 


MPV   


 


Neut % (Auto)   


 


Lymph % (Auto)   


 


Mono % (Auto)   


 


Eos % (Auto)   


 


Baso % (Auto)   


 


Neut # (Auto)   


 


Lymph # (Auto)   


 


Mono # (Auto)   


 


Eos # (Auto)   


 


Baso # (Auto)   


 


PT   


 


INR   


 


APTT   


 


Sodium  139  


 


Potassium  3.8  


 


Chloride  107  


 


Carbon Dioxide  25  


 


Anion Gap  11  


 


BUN  11  


 


Creatinine  0.8  


 


Est GFR ( Amer)  > 60  


 


Est GFR (Non-Af Amer)  > 60  


 


POC Glucose (mg/dL)   


 


Random Glucose  100  D  


 


Hemoglobin A1c   6.7 H 


 


Calcium  8.6  


 


Phosphorus   


 


Magnesium   


 


Total Bilirubin  0.3  


 


AST  23  


 


ALT  28  


 


Alkaline Phosphatase  62  


 


Troponin I  < 0.0120  


 


Total Protein  7.0  


 


Albumin  4.2  


 


Globulin  2.7  


 


Albumin/Globulin Ratio  1.5  


 


Triglycerides  98  


 


Cholesterol  164  


 


LDL Cholesterol Direct  98  


 


HDL Cholesterol  49  


 


Urine Color   


 


Urine Clarity   


 


Urine pH   


 


Ur Specific Gravity   


 


Urine Protein   


 


Urine Glucose (UA)   


 


Urine Ketones   


 


Urine Blood   


 


Urine Nitrate   


 


Urine Bilirubin   


 


Urine Urobilinogen   


 


Ur Leukocyte Esterase   


 


Urine WBC (Auto)   


 


Urine RBC (Auto)   


 


Ur Squamous Epith Cells   


 


Urine Bacteria   


 


Urine Opiates Screen   


 


Urine Methadone Screen   


 


Ur Barbiturates Screen   


 


Ur Phencyclidine Scrn   


 


Ur Amphetamines Screen   


 


U Benzodiazepines Scrn   


 


U Oth Cocaine Metabols   


 


U Cannabinoids Screen   


 


Blood Type    A POSITIVE


 


Antibody Screen    Negative














  12/26/18 12/26/18 12/26/18





  20:50 21:17 21:43


 


WBC   


 


RBC   


 


Hgb   


 


Hct   


 


MCV   


 


MCH   


 


MCHC   


 


RDW   


 


Plt Count   


 


MPV   


 


Neut % (Auto)   


 


Lymph % (Auto)   


 


Mono % (Auto)   


 


Eos % (Auto)   


 


Baso % (Auto)   


 


Neut # (Auto)   


 


Lymph # (Auto)   


 


Mono # (Auto)   


 


Eos # (Auto)   


 


Baso # (Auto)   


 


PT   


 


INR   


 


APTT   


 


Sodium   


 


Potassium   


 


Chloride   


 


Carbon Dioxide   


 


Anion Gap   


 


BUN   


 


Creatinine   


 


Est GFR ( Amer)   


 


Est GFR (Non-Af Amer)   


 


POC Glucose (mg/dL)  90  


 


Random Glucose   


 


Hemoglobin A1c   


 


Calcium   


 


Phosphorus   


 


Magnesium   


 


Total Bilirubin   


 


AST   


 


ALT   


 


Alkaline Phosphatase   


 


Troponin I   


 


Total Protein   


 


Albumin   


 


Globulin   


 


Albumin/Globulin Ratio   


 


Triglycerides   


 


Cholesterol   


 


LDL Cholesterol Direct   


 


HDL Cholesterol   


 


Urine Color   Yellow 


 


Urine Clarity   Clear 


 


Urine pH   5.0 


 


Ur Specific Gravity   1.044 H 


 


Urine Protein   Negative 


 


Urine Glucose (UA)   Normal 


 


Urine Ketones   Negative 


 


Urine Blood   Negative 


 


Urine Nitrate   Negative 


 


Urine Bilirubin   Negative 


 


Urine Urobilinogen   Normal 


 


Ur Leukocyte Esterase   Neg 


 


Urine WBC (Auto)   1 


 


Urine RBC (Auto)   1 


 


Ur Squamous Epith Cells   < 1 


 


Urine Bacteria   Rare 


 


Urine Opiates Screen    Positive H


 


Urine Methadone Screen    Negative


 


Ur Barbiturates Screen    Negative


 


Ur Phencyclidine Scrn    Negative


 


Ur Amphetamines Screen    Negative


 


U Benzodiazepines Scrn    Negative


 


U Oth Cocaine Metabols    Negative


 


U Cannabinoids Screen    Negative


 


Blood Type   


 


Antibody Screen   














  12/27/18 12/27/18 12/27/18





  06:04 06:04 06:20


 


WBC  6.7  


 


RBC  4.79  


 


Hgb  13.8  


 


Hct  42.1  


 


MCV  87.9  


 


MCH  28.7  


 


MCHC  32.7 L  


 


RDW  14.3  


 


Plt Count  182  


 


MPV  9.9  


 


Neut % (Auto)  49.4 L  


 


Lymph % (Auto)  38.4  


 


Mono % (Auto)  9.5  


 


Eos % (Auto)  1.9  


 


Baso % (Auto)  0.8  


 


Neut # (Auto)  3.3  


 


Lymph # (Auto)  2.6  


 


Mono # (Auto)  0.6  


 


Eos # (Auto)  0.1  


 


Baso # (Auto)  0.1  


 


PT   


 


INR   


 


APTT   


 


Sodium   136 


 


Potassium   3.8 


 


Chloride   107 


 


Carbon Dioxide   23 


 


Anion Gap   11 


 


BUN   12 


 


Creatinine   0.7 L 


 


Est GFR ( Amer)   > 60 


 


Est GFR (Non-Af Amer)   > 60 


 


POC Glucose (mg/dL)    100


 


Random Glucose   95 


 


Hemoglobin A1c   


 


Calcium   8.2 L 


 


Phosphorus   3.3 


 


Magnesium   2.0 


 


Total Bilirubin   0.4 


 


AST   19 


 


ALT   30 


 


Alkaline Phosphatase   53 


 


Troponin I   


 


Total Protein   6.4 


 


Albumin   3.7 


 


Globulin   2.7 


 


Albumin/Globulin Ratio   1.4 


 


Triglycerides   


 


Cholesterol   


 


LDL Cholesterol Direct   


 


HDL Cholesterol   


 


Urine Color   


 


Urine Clarity   


 


Urine pH   


 


Ur Specific Gravity   


 


Urine Protein   


 


Urine Glucose (UA)   


 


Urine Ketones   


 


Urine Blood   


 


Urine Nitrate   


 


Urine Bilirubin   


 


Urine Urobilinogen   


 


Ur Leukocyte Esterase   


 


Urine WBC (Auto)   


 


Urine RBC (Auto)   


 


Ur Squamous Epith Cells   


 


Urine Bacteria   


 


Urine Opiates Screen   


 


Urine Methadone Screen   


 


Ur Barbiturates Screen   


 


Ur Phencyclidine Scrn   


 


Ur Amphetamines Screen   


 


U Benzodiazepines Scrn   


 


U Oth Cocaine Metabols   


 


U Cannabinoids Screen   


 


Blood Type   


 


Antibody Screen   














Attending/Attestation





- Attestation


I have personally seen and examined this patient.: Yes


I have fully participated in the care of the patient.: Yes


I have reviewed all pertinent clinical information: Yes


Notes (Text): 





12/27/18 07:33


Right hemianopia, right 4/5 upper and lower ext weakness, right side reduced 

sensation, CT only slowing left occipital well demarcated infarct, suspect also 

in fronto-parital area, 


tobacco abuse


DM type 2


Heroin abuse, doesn't want family members to know.





Plan


ASA, statin, gi/dvt prophylaxis


MRI of brain as suspect more lesion, echo


Tobacco cessation


PT/OT


Neurology consult


See orders for detail.


12/27/18 07:36

## 2018-12-27 NOTE — CT
Date of service: 12/27/2018



PROCEDURE:  CT HEAD WITHOUT CONTRAST.



HISTORY:

followup from stroke



COMPARISON:

Noncontrast head CT performed 12/26/18



TECHNIQUE:

Axial computed tomography images were obtained through the head/brain 

without intravenous contrast.  



Radiation dose:



Total exam DLP = 1087.94 mGy-cm.



This CT exam was performed using one or more of the following dose 

reduction techniques: Automated exposure control, adjustment of the 

mA and/or kV according to patient size, and/or use of iterative 

reconstruction technique.



FINDINGS:

Streak artifact obscures evaluation of the skull base. 



HEMORRHAGE:

No intracranial hemorrhage. 



BRAIN:

Mild generalized atrophy.  No mass effect or edema.  



Encephalomalacia consistent with left temporal occipital infarction 

involving PCA territory.  Small left basal ganglia lacunar type 

infarct. 



Mild scattered white matter hypodensities, which are nonspecific, but 

often seen with chronic microvascular ischemic disease. 



Please note that MRI with diffusion imaging is more sensitive in the 

detection of acute ischemic event.



VENTRICLES:

No hydrocephalus. 



CALVARIUM:

Unremarkable.



PARANASAL SINUSES:

Mild mucosal thickening of the left sphenoid sinus. Remainder the 

visualized paranasal sinuses appear clear.



MASTOID AIR CELLS:

Unremarkable as visualized. No inflammatory changes.



OTHER FINDINGS:

None.



IMPRESSION:

Encephalomalacia consistent with remote left temporal/occipital lobe 

infarction, PCA territory. Additionally, nonspecific white matter 

changes are noted. 



Additional findings as above.

## 2018-12-27 NOTE — CT
Date of service: 



12/26/2018



PROCEDURE:  CT HEAD WITHOUT CONTRAST.



HISTORY:

right side numbness, left eye blindness, r/o CVA



COMPARISON:

9/17/2018



TECHNIQUE:

Axial computed tomography images were obtained through the head/brain 

without intravenous contrast.  



Radiation dose:



Total exam DLP = 1694.8 mGy-cm.



This CT exam was performed using one or more of the following dose 

reduction techniques: Automated exposure control, adjustment of the 

mA and/or kV according to patient size, and/or use of iterative 

reconstruction technique.



FINDINGS:



HEMORRHAGE:

No intracranial hemorrhage. 



BRAIN:

No mass effect or edema.  Old left occipital infarct with extensive 

focal and cephalo malacia, not evident on prior CT examination.  This 

is in PCA territory.  There is no evidence of acute infarct.  There 

is a small left basal ganglia lacunar infarct.  There is minimal 

periventricular white matter lucency consistent with chronic 

microvascular ischemic change.  There is no atrophy.  Few patchy foci 

of deep white matter lucency are also noted consistent with 

microvascular ischemic change. 



VENTRICLES:

Unremarkable. No hydrocephalus. 



CALVARIUM:

Unremarkable.



PARANASAL SINUSES:

Minimal chronic sphenoid sinusitis.



MASTOID AIR CELLS:

Unremarkable as visualized. No inflammatory changes.



OTHER FINDINGS:

None.



IMPRESSION:

Old left occipital infarct in PCA territory.  No evidence of acute 

infarct.  Mild chronic white matter ischemic change.  Mild chronic 

sphenoid sinusitis.



The preliminary findings for this examination were reported by [facility] at 8:55 p.m. on 12/26/2018.  There is concurrence of this 

report with the preliminary findings.

## 2018-12-27 NOTE — CP.PCM.CON
History of Present Illness





- History of Present Illness


History of Present Illness: 





   58 yr old male who started to feel numbness and tingling in his right leg 

with no other symptoms noted 4 days ago. He did not realize that he was not able

to see out of his right eye an eventually came to the ER last night. It was f

ound on CT head that he has a large left PCA stroke, subacute in nature. Today, 

the patient has no headache, no new weakness, no other complaints.  He has a pmh

of HTN, DM, HLD, BPH, with no prior spells. It is not clear why patient waited 

for 3 days to come to hospital but he states he did not think he was having a 

stroke. 


PMD: Elamir 





PMH: HTN, DM, BPH, HLD


PSH: cataract surgery 


Social hx: heroin use (not known to wife), smokes 12 cigarettes daily, denies 

alcohol use. 


Family hx: 


Allergies: NKDA 


Home meds: Metformin 500mg PO BID, Flomax 0.4mg PO daily, Lipitor 20mg PO daily,

ASA 81mg, Valsartan/HCTZ 12.5/160, Metoprolol 25mg PO daily


   





ROS: as above.





On exam: 


AAOX3. PERRL. Cn 2-12 normal. Visual fields show that he has a right superior 

and middle quadrantanopsia. Speech fluent. 


Walks well. No other deficits. Normal neuro exam. 


gait normal. +2 dtr ul and ll bl. Toes downgoing. 








Past Patient History





- Past Social History


Smoking Status: Heavy Smoker > 10 Cigarettes Daily





- CARDIAC


Hx Hypercholesterolemia: Yes


Hx Hypertension: Yes





- ENDOCRINE/METABOLIC


Hx Diabetes Mellitus Type 2: Yes





- GENITOURINARY/GYNECOLOGICAL


Hx Prostate Problems: Yes





- PSYCHIATRIC


Hx Substance Use: Yes





- SURGICAL HISTORY


Hx Surgeries: No





Meds


Home Medications: 


                              Home Medication List











 Medication  Instructions  Recorded  Confirmed  Type


 


Clopidogrel [Plavix] 75 mg PO DAILY #30 tab 12/28/18  Rx


 


Losartan/Hydrochlorothiazide 1 each PO DAILY #30 tablet 12/28/18  Rx





[Losartan-Hctz 100-12.5 mg Tab]    


 


Nicotine 21 mg/24 hr [Nicoderm Cq] 21 mg TD DAILY #30 patch 12/28/18  Rx











Allergies/Adverse Reactions: 


                                    Allergies











Allergy/AdvReac Type Severity Reaction Status Date / Time


 


No Known Allergies Allergy   Unverified 09/16/18 22:33














- Medications


Medications: 


                               Current Medications





Alprazolam (Xanax)  0.5 mg PO ONCE PRN


   PRN Reason: anxiety before MRI


   Last Admin: 12/27/18 10:28 Dose:  0.5 mg


Aspirin (Aspirin Chewable)  81 mg PO DAILY [facility]


   Last Admin: 12/27/18 10:30 Dose:  81 mg


Heparin Sodium (Porcine) (Heparin)  5,000 units SC Q8 [facility]


   Last Admin: 12/27/18 14:04 Dose:  5,000 units


Hydrochlorothiazide (Microzide)  12.5 mg PO DAILY [facility]


Sodium Chloride (Sodium Chloride 0.9%)  1,000 mls @ 100 mls/hr IV .Q10H [facility]


   Last Admin: 12/27/18 05:55 Dose:  100 mls/hr


Insulin Human Regular (Novolin R)  0 unit SC ACHS [facility]; Protocol


   Last Admin: 12/27/18 11:30 Dose:  Not Given


Losartan Potassium (Cozaar)  25 mg PO DAILY [facility]


   Last Admin: 12/27/18 10:28 Dose:  25 mg


Nicotine (Nicoderm Cq)  1 patch TD DAILY [facility]


   Last Admin: 12/27/18 10:31 Dose:  1 patch


Pneumococcal Polyvalent Vaccine (Pneumovax 23 Vaccine)  0.5 ml IM .ONCE ONE


   Stop: 12/28/18 10:01


Rosuvastatin Calcium (Crestor)  40 mg PO HS [facility]


Tamsulosin HCl (Flomax)  0.4 mg PO DAILY [facility]


   Last Admin: 12/27/18 10:30 Dose:  0.4 mg











Results





- Vital Signs


Recent Vital Signs: 


                                Last Vital Signs











Temp  98.2 F   12/27/18 15:00


 


Pulse  81   12/27/18 15:00


 


Resp  20   12/27/18 15:00


 


BP  158/82 H  12/27/18 15:00


 


Pulse Ox  99   12/27/18 15:00














- Labs


Result Diagrams: 


                                 12/27/18 06:04





                                 12/27/18 06:04


Labs: 


                         Laboratory Results - last 24 hr











  12/26/18 12/26/18 12/26/18





  19:28 20:42 20:42


 


WBC   6.5 


 


RBC   5.13 


 


Hgb   14.6 


 


Hct   44.5 


 


MCV   86.9 


 


MCH   28.4 


 


MCHC   32.7 L 


 


RDW   14.1 


 


Plt Count   202 


 


MPV   10.1 


 


Neut % (Auto)   49.6 L 


 


Lymph % (Auto)   38.5 


 


Mono % (Auto)   10.1 H 


 


Eos % (Auto)   1.3 


 


Baso % (Auto)   0.5 


 


Neut # (Auto)   3.2 


 


Lymph # (Auto)   2.5 


 


Mono # (Auto)   0.7 


 


Eos # (Auto)   0.1 


 


Baso # (Auto)   0.0 


 


PT    12.4 H


 


INR    1.1


 


APTT    43 H


 


Sodium   


 


Potassium   


 


Chloride   


 


Carbon Dioxide   


 


Anion Gap   


 


BUN   


 


Creatinine   


 


Est GFR ( Amer)   


 


Est GFR (Non-Af Amer)   


 


POC Glucose (mg/dL)  84  


 


Random Glucose   


 


Hemoglobin A1c   


 


Calcium   


 


Phosphorus   


 


Magnesium   


 


Total Bilirubin   


 


AST   


 


ALT   


 


Alkaline Phosphatase   


 


Troponin I   


 


Total Protein   


 


Albumin   


 


Globulin   


 


Albumin/Globulin Ratio   


 


Triglycerides   


 


Cholesterol   


 


LDL Cholesterol Direct   


 


HDL Cholesterol   


 


Urine Color   


 


Urine Clarity   


 


Urine pH   


 


Ur Specific Gravity   


 


Urine Protein   


 


Urine Glucose (UA)   


 


Urine Ketones   


 


Urine Blood   


 


Urine Nitrate   


 


Urine Bilirubin   


 


Urine Urobilinogen   


 


Ur Leukocyte Esterase   


 


Urine WBC (Auto)   


 


Urine RBC (Auto)   


 


Ur Squamous Epith Cells   


 


Urine Bacteria   


 


Urine Opiates Screen   


 


Urine Methadone Screen   


 


Ur Barbiturates Screen   


 


Ur Phencyclidine Scrn   


 


Ur Amphetamines Screen   


 


U Benzodiazepines Scrn   


 


U Oth Cocaine Metabols   


 


U Cannabinoids Screen   


 


Blood Type   


 


Antibody Screen   














  12/26/18 12/26/18 12/26/18





  20:42 20:42 20:45


 


WBC   


 


RBC   


 


Hgb   


 


Hct   


 


MCV   


 


MCH   


 


MCHC   


 


RDW   


 


Plt Count   


 


MPV   


 


Neut % (Auto)   


 


Lymph % (Auto)   


 


Mono % (Auto)   


 


Eos % (Auto)   


 


Baso % (Auto)   


 


Neut # (Auto)   


 


Lymph # (Auto)   


 


Mono # (Auto)   


 


Eos # (Auto)   


 


Baso # (Auto)   


 


PT   


 


INR   


 


APTT   


 


Sodium  139  


 


Potassium  3.8  


 


Chloride  107  


 


Carbon Dioxide  25  


 


Anion Gap  11  


 


BUN  11  


 


Creatinine  0.8  


 


Est GFR ( Amer)  > 60  


 


Est GFR (Non-Af Amer)  > 60  


 


POC Glucose (mg/dL)   


 


Random Glucose  100  D  


 


Hemoglobin A1c   6.7 H 


 


Calcium  8.6  


 


Phosphorus   


 


Magnesium   


 


Total Bilirubin  0.3  


 


AST  23  


 


ALT  28  


 


Alkaline Phosphatase  62  


 


Troponin I  < 0.0120  


 


Total Protein  7.0  


 


Albumin  4.2  


 


Globulin  2.7  


 


Albumin/Globulin Ratio  1.5  


 


Triglycerides  98  


 


Cholesterol  164  


 


LDL Cholesterol Direct  98  


 


HDL Cholesterol  49  


 


Urine Color   


 


Urine Clarity   


 


Urine pH   


 


Ur Specific Gravity   


 


Urine Protein   


 


Urine Glucose (UA)   


 


Urine Ketones   


 


Urine Blood   


 


Urine Nitrate   


 


Urine Bilirubin   


 


Urine Urobilinogen   


 


Ur Leukocyte Esterase   


 


Urine WBC (Auto)   


 


Urine RBC (Auto)   


 


Ur Squamous Epith Cells   


 


Urine Bacteria   


 


Urine Opiates Screen   


 


Urine Methadone Screen   


 


Ur Barbiturates Screen   


 


Ur Phencyclidine Scrn   


 


Ur Amphetamines Screen   


 


U Benzodiazepines Scrn   


 


U Oth Cocaine Metabols   


 


U Cannabinoids Screen   


 


Blood Type    A POSITIVE


 


Antibody Screen    Negative














  12/26/18 12/26/18 12/26/18





  20:50 21:17 21:43


 


WBC   


 


RBC   


 


Hgb   


 


Hct   


 


MCV   


 


MCH   


 


MCHC   


 


RDW   


 


Plt Count   


 


MPV   


 


Neut % (Auto)   


 


Lymph % (Auto)   


 


Mono % (Auto)   


 


Eos % (Auto)   


 


Baso % (Auto)   


 


Neut # (Auto)   


 


Lymph # (Auto)   


 


Mono # (Auto)   


 


Eos # (Auto)   


 


Baso # (Auto)   


 


PT   


 


INR   


 


APTT   


 


Sodium   


 


Potassium   


 


Chloride   


 


Carbon Dioxide   


 


Anion Gap   


 


BUN   


 


Creatinine   


 


Est GFR ( Amer)   


 


Est GFR (Non-Af Amer)   


 


POC Glucose (mg/dL)  90  


 


Random Glucose   


 


Hemoglobin A1c   


 


Calcium   


 


Phosphorus   


 


Magnesium   


 


Total Bilirubin   


 


AST   


 


ALT   


 


Alkaline Phosphatase   


 


Troponin I   


 


Total Protein   


 


Albumin   


 


Globulin   


 


Albumin/Globulin Ratio   


 


Triglycerides   


 


Cholesterol   


 


LDL Cholesterol Direct   


 


HDL Cholesterol   


 


Urine Color   Yellow 


 


Urine Clarity   Clear 


 


Urine pH   5.0 


 


Ur Specific Gravity   1.044 H 


 


Urine Protein   Negative 


 


Urine Glucose (UA)   Normal 


 


Urine Ketones   Negative 


 


Urine Blood   Negative 


 


Urine Nitrate   Negative 


 


Urine Bilirubin   Negative 


 


Urine Urobilinogen   Normal 


 


Ur Leukocyte Esterase   Neg 


 


Urine WBC (Auto)   1 


 


Urine RBC (Auto)   1 


 


Ur Squamous Epith Cells   < 1 


 


Urine Bacteria   Rare 


 


Urine Opiates Screen    Positive H


 


Urine Methadone Screen    Negative


 


Ur Barbiturates Screen    Negative


 


Ur Phencyclidine Scrn    Negative


 


Ur Amphetamines Screen    Negative


 


U Benzodiazepines Scrn    Negative


 


U Oth Cocaine Metabols    Negative


 


U Cannabinoids Screen    Negative


 


Blood Type   


 


Antibody Screen   














  12/27/18 12/27/18 12/27/18





  06:04 06:04 06:20


 


WBC  6.7  


 


RBC  4.79  


 


Hgb  13.8  


 


Hct  42.1  


 


MCV  87.9  


 


MCH  28.7  


 


MCHC  32.7 L  


 


RDW  14.3  


 


Plt Count  182  


 


MPV  9.9  


 


Neut % (Auto)  49.4 L  


 


Lymph % (Auto)  38.4  


 


Mono % (Auto)  9.5  


 


Eos % (Auto)  1.9  


 


Baso % (Auto)  0.8  


 


Neut # (Auto)  3.3  


 


Lymph # (Auto)  2.6  


 


Mono # (Auto)  0.6  


 


Eos # (Auto)  0.1  


 


Baso # (Auto)  0.1  


 


PT   


 


INR   


 


APTT   


 


Sodium   136 


 


Potassium   3.8 


 


Chloride   107 


 


Carbon Dioxide   23 


 


Anion Gap   11 


 


BUN   12 


 


Creatinine   0.7 L 


 


Est GFR ( Amer)   > 60 


 


Est GFR (Non-Af Amer)   > 60 


 


POC Glucose (mg/dL)    100


 


Random Glucose   95 


 


Hemoglobin A1c   


 


Calcium   8.2 L 


 


Phosphorus   3.3 


 


Magnesium   2.0 


 


Total Bilirubin   0.4 


 


AST   19 


 


ALT   30 


 


Alkaline Phosphatase   53 


 


Troponin I   


 


Total Protein   6.4 


 


Albumin   3.7 


 


Globulin   2.7 


 


Albumin/Globulin Ratio   1.4 


 


Triglycerides   


 


Cholesterol   


 


LDL Cholesterol Direct   


 


HDL Cholesterol   


 


Urine Color   


 


Urine Clarity   


 


Urine pH   


 


Ur Specific Gravity   


 


Urine Protein   


 


Urine Glucose (UA)   


 


Urine Ketones   


 


Urine Blood   


 


Urine Nitrate   


 


Urine Bilirubin   


 


Urine Urobilinogen   


 


Ur Leukocyte Esterase   


 


Urine WBC (Auto)   


 


Urine RBC (Auto)   


 


Ur Squamous Epith Cells   


 


Urine Bacteria   


 


Urine Opiates Screen   


 


Urine Methadone Screen   


 


Ur Barbiturates Screen   


 


Ur Phencyclidine Scrn   


 


Ur Amphetamines Screen   


 


U Benzodiazepines Scrn   


 


U Oth Cocaine Metabols   


 


U Cannabinoids Screen   


 


Blood Type   


 


Antibody Screen   














  12/27/18





  11:58


 


WBC 


 


RBC 


 


Hgb 


 


Hct 


 


MCV 


 


MCH 


 


MCHC 


 


RDW 


 


Plt Count 


 


MPV 


 


Neut % (Auto) 


 


Lymph % (Auto) 


 


Mono % (Auto) 


 


Eos % (Auto) 


 


Baso % (Auto) 


 


Neut # (Auto) 


 


Lymph # (Auto) 


 


Mono # (Auto) 


 


Eos # (Auto) 


 


Baso # (Auto) 


 


PT 


 


INR 


 


APTT 


 


Sodium 


 


Potassium 


 


Chloride 


 


Carbon Dioxide 


 


Anion Gap 


 


BUN 


 


Creatinine 


 


Est GFR ( Amer) 


 


Est GFR (Non-Af Amer) 


 


POC Glucose (mg/dL)  100


 


Random Glucose 


 


Hemoglobin A1c 


 


Calcium 


 


Phosphorus 


 


Magnesium 


 


Total Bilirubin 


 


AST 


 


ALT 


 


Alkaline Phosphatase 


 


Troponin I 


 


Total Protein 


 


Albumin 


 


Globulin 


 


Albumin/Globulin Ratio 


 


Triglycerides 


 


Cholesterol 


 


LDL Cholesterol Direct 


 


HDL Cholesterol 


 


Urine Color 


 


Urine Clarity 


 


Urine pH 


 


Ur Specific Gravity 


 


Urine Protein 


 


Urine Glucose (UA) 


 


Urine Ketones 


 


Urine Blood 


 


Urine Nitrate 


 


Urine Bilirubin 


 


Urine Urobilinogen 


 


Ur Leukocyte Esterase 


 


Urine WBC (Auto) 


 


Urine RBC (Auto) 


 


Ur Squamous Epith Cells 


 


Urine Bacteria 


 


Urine Opiates Screen 


 


Urine Methadone Screen 


 


Ur Barbiturates Screen 


 


Ur Phencyclidine Scrn 


 


Ur Amphetamines Screen 


 


U Benzodiazepines Scrn 


 


U Oth Cocaine Metabols 


 


U Cannabinoids Screen 


 


Blood Type 


 


Antibody Screen 














Assessment & Plan





- Assessment and Plan (Free Text)


Assessment: 








CT Head: Left PCA stroke, ischemic


CTA head : read as normal. 





A/P: 58 yr old male with ischemic left pca stroke, about 5 days old. He is 

stable and undergoing stroke workup. He was not a TPA candidate. 





Plan;


1. PT ST OT


2. aspirin 35 mg po daily 


3. MRI [facility]


4. ECHo


5. DC when stroke workup is complete. 


Dr. stark

## 2018-12-27 NOTE — CP.PCM.PN
<Johnny Dinh - Last Filed: 12/27/18 13:35>





Subjective





- Date & Time of Evaluation


Date of Evaluation: 12/27/18


Time of Evaluation: 09:21





- Subjective


Subjective: 


PGY3 Note for Dr. Cobos; Dr. MIAH Flores covering 





This patient seen and examined at bedside this AM; he has no acute complaints; 

states he slept well; is nervous about MRI 2/2 to claustrophobia; denies all 

other complaints. 





On repeat examination with my attending physician, the patient did not remember 

me; he did not remember the conversation that we had about his stroke and need 

to do MRI. 





Objective





- Vital Signs/Intake and Output


Vital Signs (last 24 hours): 


                                        











Temp Pulse Resp BP Pulse Ox


 


 98.8 F   75   20   143/91 H  94 L


 


 12/27/18 07:20  12/27/18 07:20  12/27/18 07:20  12/27/18 07:20  12/27/18 07:20











- Medications


Medications: 


                               Current Medications





Alprazolam (Xanax)  0.5 mg PO ONCE PRN


   PRN Reason: anxiety before MRI


Aspirin (Aspirin Chewable)  81 mg PO DAILY [facility]


Heparin Sodium (Porcine) (Heparin)  5,000 units SC Q8 [facility]


   Last Admin: 12/27/18 05:54 Dose:  5,000 units


Sodium Chloride (Sodium Chloride 0.9%)  1,000 mls @ 100 mls/hr IV .Q10H [facility]


   Last Admin: 12/27/18 05:55 Dose:  100 mls/hr


Influenza Virus Vaccine (Fluzone Quad [id number])  60 mcg IM .ONCE ONE


   Stop: 12/27/18 11:01


Insulin Human Regular (Novolin R)  0 unit SC ACHS [facility]; Protocol


   Last Admin: 12/27/18 07:38 Dose:  Not Given


Nicotine (Nicoderm Cq)  1 patch TD DAILY [facility]


Pantoprazole Sodium (Protonix Inj)  40 mg IVP DAILY [facility]


Pneumococcal Polyvalent Vaccine (Pneumovax 23 Vaccine)  0.5 ml IM .ONCE ONE


   Stop: 12/28/18 10:01


Rosuvastatin Calcium (Crestor)  40 mg PO HS [facility]


Tamsulosin HCl (Flomax)  0.4 mg PO DAILY [facility]











- Labs


Labs: 


                                        





                                 12/27/18 06:04 





                                 12/27/18 06:04 





                                        











PT  12.4 SECONDS (9.7-12.2)  H  12/26/18  20:42    


 


INR  1.1   12/26/18  20:42    


 


APTT  43 SECONDS (21-34)  H  12/26/18  20:42    














- Constitutional


Appears: Well, Non-toxic





- Head Exam


Head Exam: ATRAUMATIC, NORMAL INSPECTION





- Eye Exam


Eye Exam: EOMI, Normal appearance, PERRL


Pupil Exam: PERRL





- ENT Exam


ENT Exam: Mucous Membranes Moist





- Neck Exam


Neck Exam: Full ROM, Lymphadenopathy





- Respiratory Exam


Respiratory Exam: Clear to Ausculation Bilateral, NORMAL BREATHING PATTERN.  

absent: Rales, Rhonchi, Wheezes





- Cardiovascular Exam


Cardiovascular Exam: REGULAR RHYTHM, +S1, +S2





- GI/Abdominal Exam


GI & Abdominal Exam: Soft, Normal Bowel Sounds.  absent: Tenderness (morbidly 

obese abdomen )





- Extremities Exam


Extremities Exam: Full ROM.  absent: Calf Tenderness





- Back Exam


Back Exam: NORMAL INSPECTION.  absent: CVA tenderness (L), CVA tenderness (R)





- Neurological Exam


Neurological Exam: Alert, Awake, Oriented x3


Additional comments: 


upper extrem: left hand weaker  than right although 5/5


patient can hold hands up steady for more than 5 seconds





Drift not noted on RUE and RLE compared to left as in upper extrem


EOMI


normal sensation on face on my exam


Able to move tongue to both sides


no facial asymmetry 





- Psychiatric Exam


Psychiatric exam: Normal Affect





- Skin


Skin Exam: Warm





Assessment and Plan





- Assessment and Plan (Free Text)


Assessment: 


58 year old male with history of HTN, DM, HLD who presents for 4 day history of 

progressively worsening right upper and lower extremity numbness with vision 

changes. 





Code stroke 


Vision changes


Right sided upper and lower extremity numbness and weakness; improving


Neurologist Dr. Cuevas consulted; thank you for your help 


Received ASA 325mg PO in ED 


   -c/w daily 81mg PO aspirin


   -consider plavix/anticoagulant based on neuro recommendations 


EKG SR at 76 


CT without contrast head: new left occipital hypodense area measuring 6.6 x 3.3 

x 2.4cm compatible with subacute infarct 


CTA CHF with pulmonary edema. Severe multilevel degenerative spondylosis. 

Calcified plaque at left carotid bulb wiith mild grade stenosis 0 -39% . 


f/u ECHO


f/u Brain MRI without contrast


   -patient has claustrophobia; premedicated with xanax before MRI 


   -patient did not tolerate MRI this AM; will re-order head CT for f/u 


   -f/u cta head/neck


Crestor 40mg PO QHS 





Hx of DM


ISS 


On Metformin at home - held 





Hx of HTN


-losartan/hctz combo 25mg/12.5





Hx of HLD 


Crestor 40mg PO HS 





Nicotine dependence


Nicotine patch 





Hx of BPH


Flomax 0.4mg PO 





PPX


Heparin 5000 units SC


GI prophylaxis not indicated  


Heart healthy diet


PT/OT/speech; passed nursing bedside swallow eval





Case discussed with Dr. Anthony Dinh PGY3 





<Anthony Lozano H - Last Filed: 12/27/18 17:25>





Objective





- Vital Signs/Intake and Output


Vital Signs (last 24 hours): 


                                        











Temp Pulse Resp BP Pulse Ox


 


 98.2 F   81   20   158/82 H  99 


 


 12/27/18 15:00  12/27/18 15:00  12/27/18 15:00  12/27/18 15:00  12/27/18 15:00











- Medications


Medications: 


                               Current Medications





Alprazolam (Xanax)  0.5 mg PO ONCE PRN


   PRN Reason: anxiety before MRI


   Last Admin: 12/27/18 10:28 Dose:  0.5 mg


Aspirin (Aspirin Chewable)  81 mg PO DAILY [facility]


   Last Admin: 12/27/18 10:30 Dose:  81 mg


Heparin Sodium (Porcine) (Heparin)  5,000 units SC Q8 [facility]


   Last Admin: 12/27/18 14:04 Dose:  5,000 units


Hydrochlorothiazide (Microzide)  12.5 mg PO DAILY [facility]


Sodium Chloride (Sodium Chloride 0.9%)  1,000 mls @ 100 mls/hr IV .Q10H [facility]


   Last Admin: 12/27/18 05:55 Dose:  100 mls/hr


Insulin Human Regular (Novolin R)  0 unit SC ACHS [facility]; Protocol


   Last Admin: 12/27/18 11:30 Dose:  Not Given


Losartan Potassium (Cozaar)  25 mg PO DAILY [facility]


   Last Admin: 12/27/18 10:28 Dose:  25 mg


Nicotine (Nicoderm Cq)  1 patch TD DAILY [facility]


   Last Admin: 12/27/18 10:31 Dose:  1 patch


Pneumococcal Polyvalent Vaccine (Pneumovax 23 Vaccine)  0.5 ml IM .ONCE ONE


   Stop: 12/28/18 10:01


Rosuvastatin Calcium (Crestor)  40 mg PO HS [facility]


Tamsulosin HCl (Flomax)  0.4 mg PO DAILY [facility]


   Last Admin: 12/27/18 10:30 Dose:  0.4 mg











- Labs


Labs: 


                                        





                                 12/27/18 06:04 





                                 12/27/18 06:04 





                                        











PT  12.4 SECONDS (9.7-12.2)  H  12/26/18  20:42    


 


INR  1.1   12/26/18  20:42    


 


APTT  43 SECONDS (21-34)  H  12/26/18  20:42    














Attending/Attestation





- Attestation


I have personally seen and examined this patient.: Yes


I have fully participated in the care of the patient.: Yes


I have reviewed all pertinent clinical information, including history, physical 

exam and plan: Yes


Notes (Text): 





12/27/18 17:21


Hospitalist service is covering the patient's primary physician today. Patient 

was admitted late last night due to concerns of changes in visual fields as well

as subtle confusion. He is following commands and on examination  said he could 

not see much out of his left eye during exam. This started 4 days prior to 

admissions. He was awake and alert, muscle strength was 5/5 bilaterally in arms 

and legs (however we did not ask him to stand) 





This morning there was an order for MRI for further assessment for a CVA, 

however I was later informed he had anxiety and the MRI had to be cancelled 

despite being given ativan.


CT has shown there to be a left occipital area 6.6 x 3.3 x 2.4cm compatible with

subacute infarct 





We are pending echo results





nAthony Lozano

## 2018-12-28 VITALS — OXYGEN SATURATION: 95 %

## 2018-12-28 VITALS — DIASTOLIC BLOOD PRESSURE: 92 MMHG | HEART RATE: 83 BPM | TEMPERATURE: 98.7 F | SYSTOLIC BLOOD PRESSURE: 162 MMHG

## 2018-12-28 RX ADMIN — HUMAN INSULIN SCH: 100 INJECTION, SOLUTION SUBCUTANEOUS at 12:35

## 2018-12-28 RX ADMIN — HUMAN INSULIN SCH: 100 INJECTION, SOLUTION SUBCUTANEOUS at 10:25

## 2018-12-28 NOTE — CP.PCM.PN
Subjective





- Date & Time of Evaluation


Date of Evaluation: 12/28/18


Time of Evaluation: 09:30





- Subjective


Subjective: 





Patient examined at bedside with family in room.  No acute events overnight.  Pt

reports he had 2 episodes of post-prandial emesis, as well as one episode of 

loose stool.  Pt is eager to be discharged home.  Pt reports improvement in ri

ght sided weakness.  Reports he is unable to see anything other than what he is 

directly looking at.  Denies worsening of vision, headache, dizziness, chest 

pain.





Objective





- Vital Signs/Intake and Output


Vital Signs (last 24 hours): 


                                        











Temp Pulse Resp BP Pulse Ox


 


 98.7 F   83   20   162/92 H  95 


 


 12/28/18 07:30  12/28/18 07:30  12/28/18 07:30  12/28/18 07:30  12/28/18 07:30











- Labs


Labs: 


                                        





                                 12/27/18 06:04 





                                 12/27/18 06:04 





                                        











PT  12.4 SECONDS (9.7-12.2)  H  12/26/18  20:42    


 


INR  1.1   12/26/18  20:42    


 


APTT  43 SECONDS (21-34)  H  12/26/18  20:42    














- Constitutional


Appears: Non-toxic, No Acute Distress, Agitated





- Head Exam


Head Exam: ATRAUMATIC, NORMAL INSPECTION, NORMOCEPHALIC





- Eye Exam


Eye Exam: EOMI.  absent: Nystagmus





- ENT Exam


ENT Exam: Mucous Membranes Moist, Normal Exam





- Neck Exam


Neck Exam: Normal Inspection





- Respiratory Exam


Respiratory Exam: NORMAL BREATHING PATTERN.  absent: Respiratory Distress





- Cardiovascular Exam


Cardiovascular Exam: REGULAR RHYTHM





- GI/Abdominal Exam


GI & Abdominal Exam: Soft.  absent: Tenderness





- Extremities Exam


Extremities Exam: Normal Inspection.  absent: Calf Tenderness





- Neurological Exam


Neurological Exam: Alert, Awake, Normal Gait, Oriented x3


Neuro motor strength exam: Left Upper Extremity: 5, Right Upper Extremity: 5, 

Left Lower Extremity: 5, Right Lower Extremity: 5


Additional comments: 





sensation diminished b/l upper extremities





- Psychiatric Exam


Psychiatric exam: Anxious





- Skin


Skin Exam: Dry, Intact, Normal Color, Warm





Assessment and Plan





- Assessment and Plan (Free Text)


Assessment: 





58 year old male with remote left temporal/occipital lobe infarct, PCA territory


Plan: 





-unable to perform MRI 2/2 to pt's claustrophobia; CT findings consistent with 

stroke


-continue ASA 


-continue with PT/OT/ST


-patient advised to abstain from driving or other activities requiring complete 

visual field function


-f/u within 1 month of discharge





Discussed with Dr. Mason Serra, PGY-1

## 2018-12-28 NOTE — CARD
--------------- APPROVED REPORT --------------





Date of service: 12/27/2018



EKG Measurement

Heart [id number]

[id number]

[id number]

[id number]

[id number]



<Conclusion>

Normal sinus rhythm

Nonspecific T wave abnormality

Abnormal ECG

## 2018-12-28 NOTE — CT
Date of service: 



12/26/2018



PROCEDURE:  CTA HEAD AND NECK WITH CONTRAST



HISTORY:

visual changes, numbness left side



COMPARISON:

None available. 



TECHNIQUE:

Initial noncontrast head CT was performed. Subsequently, CT angiogram 

of the head and neck were performed after the intravenous 

administration of 80 mL of Omnipaque 350.  Contiguous 1.5mm thick 

images were obtained in the axial plane of the neck. 2-D coronal and 

sagittal MPR images were obtained. Imaging postprocessing was 

performed with 3-D images also obtained. A delayed contrast head CT 

was also obtained. This CT exam was performed using one or more of 

the following dose reduction techniques: Automated exposure control, 

adjustment of the mA and/or kV according to patient size, and/or use 

of iterative reconstruction technique.



Contrast dose: 100 mL Visipaque 320



Radiation dose:



Total exam DLP = 617.79 mGy-cm.



FINDINGS:



HEAD:

Right:



 The intracranial internal carotid artery, and anterior and middle 

cerebral arteries are widely patent.



Left:



The intracranial internal carotid artery, and anterior and middle 

cerebral arteries are widely patent. 



Posterior circulation: 



There is abrupt cutoff at the origin of the left P2 segment.  



The visualized intracranial vertebral arteries, basilar artery and 

right posterior cerebral arteries are widely patent.  Dolichoectatic 

basilar artery. 



There is no endoluminal filling defect to suggest thrombus. There is 

no intracranial saccular aneurysm.



NECK:

There is a three vessel aortic arch. There is no stenosis at the 

origins of the great vessels at the level of the aortic arch. 



There are calcified atherosclerotic plaques in the left carotid bulb 

and proximal internal carotid artery.  There are also atherosclerotic 

calcifications at the origin of the left subclavian artery and in the 

aortic arch. 



Right Carotid:



On the right, the common carotid, internal carotid and external 

carotid arteries are widely patent.



There is no hemodynamically significant stenosis in the internal 

carotid artery by NASCET criteria.



Left Carotid:



On the left, the common carotid, internal carotid and external 

carotid arteries are widely patent.



There is no hemodynamically significant stenosis in the internal 

carotid artery by NASCET criteria.



The vertebral arteries are widely patent. 



The visualized soft tissues of the neck are normal. There is cystic 

encephalomalacia and gliosis in the left occipital and medial 

temporal lobes, sequela of remote PCA infarction. 



There is patchy ground-glass attenuation in the visualized lungs 

which may represent alveolar edema or nonspecific 

infection/inflammation.  There is mild paraseptal emphysema in the 

right apex.. 



IMPRESSION:

1. Occlusion at the origin of left P2 segment, likely chronic given 

presence of cystic encephalomalacia and gliosis in the left medial 

temporal and occipital lobes.. 



2. No evidence of hemodynamically significant stenosis in the 

internal carotid arteries.



3. Patent bilateral vertebral arteries.



There is a discrepancy with the preliminary [facility] read.  The final 

report is tagged to the PA review folder.

## 2018-12-28 NOTE — CP.PCM.PN
Subjective





- Date & Time of Evaluation


Date of Evaluation: 12/28/18


Time of Evaluation: 09:03





- Subjective


Subjective: 


PGY3 Note for Dr. Cobos; Medicine 





The patient was seen and examined at bedside with attending physician today; he 

has no acute complaints and is anxious to get home. His friend was at bedside; 

and patient was nervously and rushedly changing into his street clothes. He 

seems unable to verbalize what has actually happened in the hospital to him on 

teachback, although I have personally attempted to explain that the patient had 

a very severe stroke and would benefit from PT/OT at acute rehab as per PT; 

patient wishes to go home as "he feels fine". He was encouraged to followup with

PT at home, and with neurology to see how his symptoms are improving. He denies 

all symptoms today. 





Objective





- Vital Signs/Intake and Output


Vital Signs (last 24 hours): 


                                        











Temp Pulse Resp BP Pulse Ox


 


 98.7 F   83   20   162/92 H  95 


 


 12/28/18 07:30  12/28/18 07:30  12/28/18 07:30  12/28/18 07:30  12/28/18 07:30











- Medications


Medications: 


                               Current Medications





Alprazolam (Xanax)  0.5 mg PO ONCE PRN


   PRN Reason: anxiety before MRI


   Last Admin: 12/27/18 10:28 Dose:  0.5 mg


Aspirin (Aspirin Chewable)  81 mg PO DAILY [facility]


   Last Admin: 12/27/18 10:30 Dose:  81 mg


Clopidogrel Bisulfate (Plavix)  75 mg PO DAILY [facility]


Heparin Sodium (Porcine) (Heparin)  5,000 units SC Q8 [facility]


   Last Admin: 12/28/18 06:13 Dose:  5,000 units


Hydrochlorothiazide (Microzide)  12.5 mg PO DAILY [facility]


Sodium Chloride (Sodium Chloride 0.9%)  1,000 mls @ 100 mls/hr IV .Q10H [facility]


   Last Admin: 12/27/18 17:47 Dose:  100 mls/hr


Insulin Human Regular (Novolin R)  0 unit SC ACHS [facility]; Protocol


   Last Admin: 12/27/18 21:45 Dose:  Not Given


Losartan Potassium (Cozaar)  100 mg PO DAILY [facility]


Nicotine (Nicoderm Cq)  1 patch TD DAILY [facility]


   Last Admin: 12/27/18 10:31 Dose:  1 patch


Ondansetron HCl (Zofran Inj)  4 mg IVP Q6 PRN


   PRN Reason: Nausea/Vomiting


   Last Admin: 12/28/18 06:12 Dose:  4 mg


Pneumococcal Polyvalent Vaccine (Pneumovax 23 Vaccine)  0.5 ml IM .ONCE ONE


   Stop: 12/28/18 10:01


Rosuvastatin Calcium (Crestor)  40 mg PO HS [facility]


   Last Admin: 12/27/18 21:46 Dose:  40 mg


Tamsulosin HCl (Flomax)  0.4 mg PO DAILY [facility]


   Last Admin: 12/27/18 10:30 Dose:  0.4 mg











- Labs


Labs: 


                                        





                                 12/27/18 06:04 





                                 12/27/18 06:04 





                                        











PT  12.4 SECONDS (9.7-12.2)  H  12/26/18  20:42    


 


INR  1.1   12/26/18  20:42    


 


APTT  43 SECONDS (21-34)  H  12/26/18  20:42    














- Constitutional


Appears: Non-toxic





- Head Exam


Head Exam: ATRAUMATIC





- Eye Exam


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





- ENT Exam


ENT Exam: Mucous Membranes Moist





- Neck Exam


Neck Exam: Full ROM.  absent: Lymphadenopathy





- Respiratory Exam


Respiratory Exam: Clear to Ausculation Bilateral, NORMAL BREATHING PATTERN.  

absent: Rales, Rhonchi, Wheezes





- Cardiovascular Exam


Cardiovascular Exam: REGULAR RHYTHM, +S1, +S2





- GI/Abdominal Exam


GI & Abdominal Exam: Soft, Normal Bowel Sounds.  absent: Tenderness





- Extremities Exam


Extremities Exam: Full ROM.  absent: Calf Tenderness





- Back Exam


Back Exam: NORMAL INSPECTION.  absent: CVA tenderness (L), CVA tenderness (R)





- Neurological Exam


Neurological Exam: Alert, Awake, Normal Gait





- Skin


Skin Exam: Warm





Assessment and Plan





- Assessment and Plan (Free Text)


Assessment: 


58 year old male with history of HTN, DM, HLD who presents for 4 day history of 

progressively worsening right upper and lower extremity numbness with vision 

changes. 





Code stroke 


Vision changes


Right sided upper and lower extremity numbness and weakness; improving


Neurologist Dr. Cuevas consulted; thank you for your help 


Received ASA 325mg PO in ED 


   -c/w daily 81mg PO aspirin


   -will start plavix 75mg PO daily for stroke prevention


   -CTA shows 0-39% stenosis at carotid bulb; ICA b/l clear; medical management;

lipid control, DM control, HTN control


EKG SR at 76 


CT without contrast head: new left occipital hypodense area measuring 6.6 x 3.3 

x 2.4cm compatible with subacute infarct 


CTA CHF with pulmonary edema. Severe multilevel degenerative spondylosis. 

Calcified plaque at left carotid bulb wiith mild grade stenosis 0 -39% . 


f/u ECHO


f/u Brain MRI without contrast


   -patient has claustrophobia; premedicated with xanax before MRI 


   -patient did not tolerate MRI; repeat CT showed no evolution in stroke or 

conversion into hemorrhagic 


Crestor 40mg PO QHS 





Hx of DM


ISS 


On Metformin at home - held 


HbA1C: 6.7; controlled DM; c/w current management; encourage lifestyle 

modification





Hx of HTN


-losartan/hctz combo 100mg/12.5





Hx of HLD 


Crestor 40mg PO HS 


lifestyle modification; patient nearly at LDL goal 98





Nicotine dependence


Nicotine patch 





Hx of BPH


Flomax 0.4mg PO 





PPX


Heparin 5000 units SC


GI prophylaxis not indicated  


Heart healthy diet


PT/OT/speech; passed nursing bedside swallow eval





Case discussed with Dr. Cobos 





The patient is likely eligible for d/c to [facility] as per PT recommendation for PT/OT

rehabilitation s/p stroke 


C/w losartan 100mg/hctz 12.5mg 


c/w aspirin 81mg PO daily 


add plavix 75 PO daily 


c/w statin crestor 40mg daily 


c/w metformin 500mg PO BID 


echo was normal: EF 60-65% EF with no diastolic dysfunction; no runs of afibb 

while on tele


patient given smoking cessation counseling and nicotine patch


drug abuse counseling; patient tested positive for opiates 


diet and exercise counseling given 


Risk for stroke: DM, HLD, HTN, current smoker, [facility] PGY3

## 2022-04-26 ENCOUNTER — ESTABLISHED COMPREHENSIVE EXAM (OUTPATIENT)
Dept: URBAN - METROPOLITAN AREA CLINIC 110 | Facility: CLINIC | Age: 62
End: 2022-04-26

## 2022-04-26 DIAGNOSIS — H25.9: ICD-10-CM

## 2022-04-26 DIAGNOSIS — H47.293: ICD-10-CM

## 2022-04-26 DIAGNOSIS — H52.4: ICD-10-CM

## 2022-04-26 DIAGNOSIS — Z96.1: ICD-10-CM

## 2022-04-26 PROCEDURE — 92014 COMPRE OPH EXAM EST PT 1/>: CPT

## 2022-04-26 PROCEDURE — 92134 CPTRZ OPH DX IMG PST SGM RTA: CPT

## 2022-04-26 PROCEDURE — 92083 EXTENDED VISUAL FIELD XM: CPT

## 2022-04-26 PROCEDURE — 92015 DETERMINE REFRACTIVE STATE: CPT

## 2022-04-26 ASSESSMENT — VISUAL ACUITY
OD_PH: 20/30
OS_PH: 20/100
OU_SC: J5
OS_SC: 20/100
OD_SC: 20/30

## 2022-04-26 ASSESSMENT — TONOMETRY
OD_IOP_MMHG: 10
OS_IOP_MMHG: 12